# Patient Record
Sex: MALE | URBAN - METROPOLITAN AREA
[De-identification: names, ages, dates, MRNs, and addresses within clinical notes are randomized per-mention and may not be internally consistent; named-entity substitution may affect disease eponyms.]

---

## 2021-05-05 ENCOUNTER — APPOINTMENT (OUTPATIENT)
Age: 21
Setting detail: DERMATOLOGY
End: 2021-05-06

## 2021-05-05 VITALS — HEIGHT: 69 IN | RESPIRATION RATE: 18 BRPM | TEMPERATURE: 98.1 F | WEIGHT: 152 LBS

## 2021-05-05 DIAGNOSIS — L20.89 OTHER ATOPIC DERMATITIS: ICD-10-CM

## 2021-05-05 PROCEDURE — OTHER MEDICATION COUNSELING: OTHER

## 2021-05-05 PROCEDURE — OTHER EASI CALCULATION: OTHER

## 2021-05-05 PROCEDURE — 99203 OFFICE O/P NEW LOW 30 MIN: CPT

## 2021-05-05 PROCEDURE — OTHER COUNSELING: OTHER

## 2021-05-05 PROCEDURE — OTHER PRESCRIPTION MEDICATION MANAGEMENT: OTHER

## 2021-05-05 PROCEDURE — OTHER MIPS QUALITY: OTHER

## 2021-05-05 PROCEDURE — OTHER PRESCRIPTION: OTHER

## 2021-05-05 RX ORDER — DESONIDE 0.5 MG/G
CREAM TOPICAL BID
Qty: 1 | Refills: 3 | Status: ERX | COMMUNITY
Start: 2021-05-05

## 2021-05-05 RX ORDER — PREDNISONE 10 MG/1
TABLET ORAL
Qty: 40 | Refills: 0 | Status: ERX | COMMUNITY
Start: 2021-05-05

## 2021-05-05 ASSESSMENT — LOCATION ZONE DERM
LOCATION ZONE: NECK
LOCATION ZONE: SCALP
LOCATION ZONE: FACE
LOCATION ZONE: EYELID

## 2021-05-05 ASSESSMENT — LOCATION DETAILED DESCRIPTION DERM
LOCATION DETAILED: LEFT SUPERIOR LID MARGIN
LOCATION DETAILED: LEFT SUPERIOR PARIETAL SCALP
LOCATION DETAILED: MID POSTERIOR NECK
LOCATION DETAILED: LEFT MEDIAL FOREHEAD
LOCATION DETAILED: RIGHT LATERAL SUPERIOR EYELID

## 2021-05-05 ASSESSMENT — LOCATION SIMPLE DESCRIPTION DERM
LOCATION SIMPLE: LEFT FOREHEAD
LOCATION SIMPLE: RIGHT SUPERIOR EYELID
LOCATION SIMPLE: POSTERIOR NECK
LOCATION SIMPLE: LEFT SUPERIOR EYELID
LOCATION SIMPLE: SCALP

## 2021-05-05 NOTE — PROCEDURE: PRESCRIPTION MEDICATION MANAGEMENT
Render In Strict Bullet Format?: No
Plan: Return to clinic in 4 weeks for a follow up visit
Initiate Treatment: prednisone 10 mg tablet, Take 4 pills PO x4days, 3pills PO x4 days, 2 pills PO x4 days, 1 pill PO x4 days as directed. Take with food and drink\\ndesonide 0.05 % topical cream, Apply to affected areas of rash on face and neck twice daily x14 days, stop x 7 days. Repeat cycle as needed for flares
Detail Level: Zone

## 2021-05-05 NOTE — PROCEDURE: EASI CALCULATION
Body Surface % (Upper Limbs): 10-29 %
Lichenification (Head): Moderate
Detail Level: Detailed
Erythema (Trunk To Groin): None
Body Surface % (Head): 50-69%
Show Score For Individual Areas?: No
Age Of Patient: 7 Years or Older
Excoriation (Upper Limbs): Mild
Erythema (Head): Severe

## 2021-06-03 ENCOUNTER — APPOINTMENT (OUTPATIENT)
Dept: URBAN - METROPOLITAN AREA CLINIC 265 | Age: 21
Setting detail: DERMATOLOGY
End: 2021-06-03

## 2021-06-03 VITALS — WEIGHT: 152 LBS | HEIGHT: 69 IN | RESPIRATION RATE: 18 BRPM

## 2021-06-03 DIAGNOSIS — L20.89 OTHER ATOPIC DERMATITIS: ICD-10-CM

## 2021-06-03 PROCEDURE — OTHER PRESCRIPTION MEDICATION MANAGEMENT: OTHER

## 2021-06-03 PROCEDURE — OTHER COUNSELING: OTHER

## 2021-06-03 PROCEDURE — OTHER MEDICATION COUNSELING: OTHER

## 2021-06-03 PROCEDURE — 99213 OFFICE O/P EST LOW 20 MIN: CPT

## 2021-06-03 PROCEDURE — OTHER EASI CALCULATION: OTHER

## 2021-06-03 PROCEDURE — OTHER PRESCRIPTION: OTHER

## 2021-06-03 RX ORDER — TRIAMCINOLONE ACETONIDE 1 MG/G
CREAM TOPICAL BID
Qty: 1 | Refills: 2 | Status: ERX | COMMUNITY
Start: 2021-06-03

## 2021-06-03 RX ORDER — CRISABOROLE 20 MG/G
OINTMENT TOPICAL
Qty: 1 | Refills: 3 | Status: ERX | COMMUNITY
Start: 2021-06-03

## 2021-06-03 ASSESSMENT — LOCATION SIMPLE DESCRIPTION DERM
LOCATION SIMPLE: SCALP
LOCATION SIMPLE: LEFT SUPERIOR EYELID
LOCATION SIMPLE: RIGHT SUPERIOR EYELID
LOCATION SIMPLE: LEFT FOREHEAD
LOCATION SIMPLE: POSTERIOR NECK

## 2021-06-03 ASSESSMENT — LOCATION DETAILED DESCRIPTION DERM
LOCATION DETAILED: LEFT SUPERIOR LID MARGIN
LOCATION DETAILED: LEFT SUPERIOR PARIETAL SCALP
LOCATION DETAILED: LEFT MEDIAL FOREHEAD
LOCATION DETAILED: RIGHT LATERAL SUPERIOR EYELID
LOCATION DETAILED: MID POSTERIOR NECK

## 2021-06-03 ASSESSMENT — LOCATION ZONE DERM
LOCATION ZONE: NECK
LOCATION ZONE: EYELID
LOCATION ZONE: SCALP
LOCATION ZONE: FACE

## 2021-06-03 NOTE — PROCEDURE: PRESCRIPTION MEDICATION MANAGEMENT
Plan: Return to clinic in 6 weeks for a follow up visit.
Initiate Treatment: Triamcinolone acetonide 0.1 % topical cream, Apply to affected areas of rash twice a day x 14 days, stop x 7 days. Repeat cycle as needed.\\nEucrisa 2 % topical ointment, Apply to affected areas of face, neck and arms once daily on off week of triamcinolone cream
Detail Level: Zone
Render In Strict Bullet Format?: No
Discontinue Regimen: desonide 0.05 % topical cream, Apply to affected areas of rash on face and neck twice daily x14 days, stop x 7 days. Repeat cycle as needed for flares

## 2021-06-03 NOTE — HPI: RASH (ATOPIC DERMATITIS)
How Severe Is Your Atopic Dermatitis?: moderate
Is This A New Presentation, Or A Follow-Up?: Follow Up Atopic Dermatitis
Additional History: still flaring on eyelids and arms but improved from last visit

## 2021-06-03 NOTE — PROCEDURE: EASI CALCULATION
Induration/Papulation (Trunk To Groin): None
Induration/Papulation (Head): Moderate
Erythema (Head): Severe
Age Of Patient: 7 Years or Older
Excoriation (Upper Limbs): Mild
Show Score For Individual Areas?: No
Body Surface % (Head): 10-29 %
Detail Level: Detailed
